# Patient Record
Sex: MALE | Race: WHITE | NOT HISPANIC OR LATINO | Employment: FULL TIME | URBAN - NONMETROPOLITAN AREA
[De-identification: names, ages, dates, MRNs, and addresses within clinical notes are randomized per-mention and may not be internally consistent; named-entity substitution may affect disease eponyms.]

---

## 2017-11-01 ENCOUNTER — TELEPHONE (OUTPATIENT)
Dept: INTERNAL MEDICINE | Age: 52
End: 2017-11-01

## 2017-11-01 DIAGNOSIS — K63.5 POLYP OF COLON, UNSPECIFIED PART OF COLON, UNSPECIFIED TYPE: Primary | ICD-10-CM

## 2017-11-10 ENCOUNTER — TELEPHONE (OUTPATIENT)
Dept: GASTROENTEROLOGY | Age: 52
End: 2017-11-10

## 2017-11-20 DIAGNOSIS — Z86.010 PERSONAL HISTORY OF COLONIC POLYPS: Primary | ICD-10-CM

## 2017-12-19 RX ORDER — SODIUM CHLORIDE 9 MG/ML
INJECTION, SOLUTION INTRAVENOUS CONTINUOUS
Status: CANCELLED | OUTPATIENT
Start: 2017-12-19

## 2017-12-19 RX ORDER — 0.9 % SODIUM CHLORIDE 0.9 %
2 VIAL (ML) INJECTION PRN
Status: CANCELLED | OUTPATIENT
Start: 2017-12-19

## 2017-12-19 RX ORDER — PEG-3350, SODIUM SULFATE, SODIUM CHLORIDE, POTASSIUM CHLORIDE, SODIUM ASCORBATE AND ASCORBIC ACID 7.5-2.691G
2000 KIT ORAL ONCE
Qty: 1 EACH | Refills: 0 | Status: SHIPPED | OUTPATIENT
Start: 2017-12-19 | End: 2017-12-22

## 2017-12-19 RX ORDER — 0.9 % SODIUM CHLORIDE 0.9 %
2 VIAL (ML) INJECTION EVERY 12 HOURS SCHEDULED
Status: CANCELLED | OUTPATIENT
Start: 2017-12-19

## 2017-12-22 ASSESSMENT — ACTIVITIES OF DAILY LIVING (ADL)
CHRONIC_PAIN_PRESENT: NO
ADL_SHORT_OF_BREATH: NO
HISTORY OF FALLING IN THE LAST YEAR (PRIOR TO ADMISSION): NO
ADL_BEFORE_ADMISSION: INDEPENDENT
SENSORY_SUPPORT_DEVICES: EYEGLASSES
NEEDS_ASSIST: NO
RECENT_DECLINE_ADL: NO
ADL_SCORE: 12

## 2017-12-22 ASSESSMENT — COGNITIVE AND FUNCTIONAL STATUS - GENERAL
ARE YOU DEAF OR DO YOU HAVE SERIOUS DIFFICULTY  HEARING: NO
ARE YOU BLIND OR DO YOU HAVE SERIOUS DIFFICULTY SEEING, EVEN WHEN WEARING GLASSES: NO

## 2017-12-27 ENCOUNTER — SURGERY (OUTPATIENT)
Age: 52
End: 2017-12-27

## 2017-12-27 ENCOUNTER — HOSPITAL ENCOUNTER (OUTPATIENT)
Age: 52
Discharge: HOME | End: 2017-12-27
Attending: INTERNAL MEDICINE | Admitting: INTERNAL MEDICINE

## 2017-12-27 VITALS
OXYGEN SATURATION: 94 % | HEART RATE: 68 BPM | BODY MASS INDEX: 26.82 KG/M2 | DIASTOLIC BLOOD PRESSURE: 71 MMHG | SYSTOLIC BLOOD PRESSURE: 121 MMHG | HEIGHT: 73 IN | WEIGHT: 202.38 LBS | TEMPERATURE: 97.6 F | RESPIRATION RATE: 16 BRPM

## 2017-12-27 DIAGNOSIS — Z86.010 PERSONAL HISTORY OF COLONIC POLYPS: ICD-10-CM

## 2017-12-27 PROCEDURE — 10004348 HB COUNTER-EVAL PRE-OP

## 2017-12-27 PROCEDURE — 10004651 HB RX, NO CHARGE ITEM: Performed by: INTERNAL MEDICINE

## 2017-12-27 PROCEDURE — 10002807 HB RX 258: Performed by: INTERNAL MEDICINE

## 2017-12-27 PROCEDURE — 10003428 HB COLONOSCOPY: Performed by: INTERNAL MEDICINE

## 2017-12-27 PROCEDURE — 88305 TISSUE EXAM BY PATHOLOGIST: CPT

## 2017-12-27 PROCEDURE — 10002800 HB RX 250 W HCPCS: Performed by: INTERNAL MEDICINE

## 2017-12-27 PROCEDURE — 10003056 HB DISPOSABLE INSTRUMENT/SUPPLY 1: Performed by: INTERNAL MEDICINE

## 2017-12-27 PROCEDURE — 45385 COLONOSCOPY W/LESION REMOVAL: CPT | Performed by: INTERNAL MEDICINE

## 2017-12-27 PROCEDURE — G0500 MOD SEDAT ENDO SERVICE >5YRS: HCPCS | Performed by: INTERNAL MEDICINE

## 2017-12-27 RX ORDER — 0.9 % SODIUM CHLORIDE 0.9 %
2 VIAL (ML) INJECTION PRN
Status: DISCONTINUED | OUTPATIENT
Start: 2017-12-27 | End: 2017-12-27 | Stop reason: HOSPADM

## 2017-12-27 RX ORDER — SODIUM CHLORIDE 9 MG/ML
INJECTION, SOLUTION INTRAVENOUS CONTINUOUS
Status: DISCONTINUED | OUTPATIENT
Start: 2017-12-27 | End: 2017-12-27 | Stop reason: HOSPADM

## 2017-12-27 RX ORDER — 0.9 % SODIUM CHLORIDE 0.9 %
2 VIAL (ML) INJECTION EVERY 12 HOURS SCHEDULED
Status: DISCONTINUED | OUTPATIENT
Start: 2017-12-27 | End: 2017-12-27 | Stop reason: HOSPADM

## 2017-12-27 RX ORDER — ONDANSETRON 2 MG/ML
INJECTION INTRAMUSCULAR; INTRAVENOUS PRN
Status: DISCONTINUED | OUTPATIENT
Start: 2017-12-27 | End: 2017-12-27 | Stop reason: HOSPADM

## 2017-12-27 RX ORDER — MIDAZOLAM HYDROCHLORIDE 1 MG/ML
INJECTION, SOLUTION INTRAMUSCULAR; INTRAVENOUS PRN
Status: DISCONTINUED | OUTPATIENT
Start: 2017-12-27 | End: 2017-12-27 | Stop reason: HOSPADM

## 2017-12-27 RX ADMIN — MIDAZOLAM HYDROCHLORIDE 1 MG: 1 INJECTION, SOLUTION INTRAMUSCULAR; INTRAVENOUS at 09:41

## 2017-12-27 RX ADMIN — MIDAZOLAM HYDROCHLORIDE 1 MG: 1 INJECTION, SOLUTION INTRAMUSCULAR; INTRAVENOUS at 09:33

## 2017-12-27 RX ADMIN — FENTANYL CITRATE 100 MCG: 50 INJECTION INTRAMUSCULAR; INTRAVENOUS at 09:29

## 2017-12-27 RX ADMIN — SODIUM CHLORIDE: 9 INJECTION, SOLUTION INTRAVENOUS at 09:15

## 2017-12-27 RX ADMIN — MIDAZOLAM HYDROCHLORIDE 1 MG: 1 INJECTION, SOLUTION INTRAMUSCULAR; INTRAVENOUS at 09:35

## 2017-12-27 RX ADMIN — ONDANSETRON 4 MG: 2 INJECTION INTRAMUSCULAR; INTRAVENOUS at 09:25

## 2017-12-27 RX ADMIN — MIDAZOLAM HYDROCHLORIDE 1 MG: 1 INJECTION, SOLUTION INTRAMUSCULAR; INTRAVENOUS at 09:31

## 2017-12-27 RX ADMIN — MIDAZOLAM HYDROCHLORIDE 2 MG: 1 INJECTION, SOLUTION INTRAMUSCULAR; INTRAVENOUS at 09:27

## 2017-12-27 RX ADMIN — SODIUM CHLORIDE 2 ML: 9 INJECTION INTRAMUSCULAR; INTRAVENOUS; SUBCUTANEOUS at 09:49

## 2017-12-27 RX ADMIN — FENTANYL CITRATE 25 MCG: 50 INJECTION INTRAMUSCULAR; INTRAVENOUS at 09:38

## 2017-12-27 ASSESSMENT — ACTIVITIES OF DAILY LIVING (ADL)
CHRONIC_PAIN_PRESENT: NO
ADL_BEFORE_ADMISSION: INDEPENDENT
HISTORY OF FALLING IN THE LAST YEAR (PRIOR TO ADMISSION): NO
NEEDS_ASSIST: NO
SENSORY_SUPPORT_DEVICES: EYEGLASSES
ADL_SCORE: 12
RECENT_DECLINE_ADL: NO
ADL_SHORT_OF_BREATH: NO

## 2017-12-27 ASSESSMENT — PAIN SCALES - GENERAL
PAIN_LEVEL_AT_REST: 0

## 2017-12-28 LAB — PATHOLOGIST NAME: NORMAL

## 2018-01-11 ENCOUNTER — TELEPHONE (OUTPATIENT)
Dept: GASTROENTEROLOGY | Age: 53
End: 2018-01-11

## 2018-08-28 ENCOUNTER — HOSPITAL ENCOUNTER (OUTPATIENT)
Dept: INFUSION THERAPY | Age: 53
Discharge: STILL ADMITTED OR ADMINISTRATIVE | End: 2018-08-28
Attending: INTERNAL MEDICINE

## 2018-08-28 VITALS
OXYGEN SATURATION: 96 % | WEIGHT: 218.48 LBS | HEART RATE: 85 BPM | BODY MASS INDEX: 28.82 KG/M2 | TEMPERATURE: 98.1 F | RESPIRATION RATE: 16 BRPM | SYSTOLIC BLOOD PRESSURE: 135 MMHG | DIASTOLIC BLOOD PRESSURE: 85 MMHG

## 2018-08-28 DIAGNOSIS — Z20.9 EXPOSURE TO BAT WITHOUT KNOWN BITE: ICD-10-CM

## 2018-08-28 DIAGNOSIS — Z20.9 EXPOSURE TO BAT WITHOUT KNOWN BITE: Primary | ICD-10-CM

## 2018-08-28 PROCEDURE — 90375 RABIES IG IM/SC: CPT | Performed by: INTERNAL MEDICINE

## 2018-08-28 PROCEDURE — 10002800 HB RX 250 W HCPCS: Performed by: INTERNAL MEDICINE

## 2018-08-28 PROCEDURE — 90472 IMMUNIZATION ADMIN EACH ADD: CPT | Performed by: INTERNAL MEDICINE

## 2018-08-28 PROCEDURE — 90675 RABIES VACCINE IM: CPT | Performed by: INTERNAL MEDICINE

## 2018-08-28 PROCEDURE — 90471 IMMUNIZATION ADMIN: CPT | Performed by: INTERNAL MEDICINE

## 2018-08-28 RX ADMIN — HUMAN RABIES VIRUS IMMUNE GLOBULIN 1950 UNITS: 150 INJECTION, SOLUTION INTRAMUSCULAR at 17:05

## 2018-08-28 RX ADMIN — RABIES VIRUS STRAIN PM-1503-3M ANTIGEN (PROPIOLACTONE INACTIVATED) AND WATER 2.5 UNITS: KIT at 17:15

## 2018-08-28 ASSESSMENT — PAIN SCALES - GENERAL: PAIN_LEVEL_AT_REST: 0

## 2018-08-31 ENCOUNTER — HOSPITAL ENCOUNTER (OUTPATIENT)
Dept: INFUSION THERAPY | Age: 53
Discharge: STILL ADMITTED OR ADMINISTRATIVE | End: 2018-08-31
Attending: INTERNAL MEDICINE

## 2018-08-31 VITALS
HEART RATE: 78 BPM | DIASTOLIC BLOOD PRESSURE: 82 MMHG | RESPIRATION RATE: 16 BRPM | SYSTOLIC BLOOD PRESSURE: 139 MMHG | TEMPERATURE: 98 F

## 2018-08-31 DIAGNOSIS — Z20.9 EXPOSURE TO BAT WITHOUT KNOWN BITE: ICD-10-CM

## 2018-08-31 PROCEDURE — 90471 IMMUNIZATION ADMIN: CPT | Performed by: INTERNAL MEDICINE

## 2018-08-31 PROCEDURE — 90675 RABIES VACCINE IM: CPT | Performed by: INTERNAL MEDICINE

## 2018-08-31 PROCEDURE — 10002800 HB RX 250 W HCPCS: Performed by: INTERNAL MEDICINE

## 2018-08-31 RX ADMIN — RABIES VIRUS STRAIN PM-1503-3M ANTIGEN (PROPIOLACTONE INACTIVATED) AND WATER 2.5 UNITS: KIT at 08:48

## 2018-09-04 ENCOUNTER — HOSPITAL ENCOUNTER (OUTPATIENT)
Dept: INFUSION THERAPY | Age: 53
Discharge: STILL ADMITTED OR ADMINISTRATIVE | End: 2018-09-04
Attending: INTERNAL MEDICINE

## 2018-09-04 VITALS
SYSTOLIC BLOOD PRESSURE: 135 MMHG | TEMPERATURE: 98.1 F | DIASTOLIC BLOOD PRESSURE: 85 MMHG | RESPIRATION RATE: 16 BRPM | HEART RATE: 73 BPM

## 2018-09-04 DIAGNOSIS — Z20.9 EXPOSURE TO BAT WITHOUT KNOWN BITE: ICD-10-CM

## 2018-09-04 PROCEDURE — 90471 IMMUNIZATION ADMIN: CPT

## 2018-09-04 PROCEDURE — 10002800 HB RX 250 W HCPCS

## 2018-09-04 PROCEDURE — 90675 RABIES VACCINE IM: CPT

## 2018-09-04 RX ADMIN — RABIES VIRUS STRAIN PM-1503-3M ANTIGEN (PROPIOLACTONE INACTIVATED) AND WATER 2.5 UNITS: KIT at 14:45

## 2018-09-11 ENCOUNTER — HOSPITAL ENCOUNTER (OUTPATIENT)
Dept: INFUSION THERAPY | Age: 53
Discharge: STILL ADMITTED OR ADMINISTRATIVE | End: 2018-09-11
Attending: INTERNAL MEDICINE

## 2018-09-11 VITALS
TEMPERATURE: 98 F | HEART RATE: 78 BPM | DIASTOLIC BLOOD PRESSURE: 76 MMHG | OXYGEN SATURATION: 96 % | SYSTOLIC BLOOD PRESSURE: 141 MMHG

## 2018-09-11 DIAGNOSIS — Z20.9 EXPOSURE TO BAT WITHOUT KNOWN BITE: ICD-10-CM

## 2018-09-11 PROCEDURE — 10002800 HB RX 250 W HCPCS: Performed by: INTERNAL MEDICINE

## 2018-09-11 PROCEDURE — 90675 RABIES VACCINE IM: CPT | Performed by: INTERNAL MEDICINE

## 2018-09-11 PROCEDURE — 90471 IMMUNIZATION ADMIN: CPT | Performed by: INTERNAL MEDICINE

## 2018-09-11 RX ADMIN — RABIES VIRUS STRAIN PM-1503-3M ANTIGEN (PROPIOLACTONE INACTIVATED) AND WATER 2.5 UNITS: KIT at 13:26

## 2018-12-26 ENCOUNTER — E-ADVICE (OUTPATIENT)
Dept: INTERNAL MEDICINE | Age: 53
End: 2018-12-26

## 2018-12-26 ENCOUNTER — OFFICE VISIT (OUTPATIENT)
Dept: DERMATOLOGY | Age: 53
End: 2018-12-26

## 2018-12-26 VITALS
BODY MASS INDEX: 30.05 KG/M2 | WEIGHT: 227.8 LBS | SYSTOLIC BLOOD PRESSURE: 136 MMHG | DIASTOLIC BLOOD PRESSURE: 84 MMHG | HEART RATE: 78 BPM

## 2018-12-26 DIAGNOSIS — B35.1 ONYCHOMYCOSIS: Primary | ICD-10-CM

## 2018-12-26 PROCEDURE — 99202 OFFICE O/P NEW SF 15 MIN: CPT | Performed by: DERMATOLOGY

## 2018-12-26 RX ORDER — TERBINAFINE HYDROCHLORIDE 250 MG/1
250 TABLET ORAL DAILY
Qty: 84 TABLET | Refills: 0 | Status: SHIPPED | OUTPATIENT
Start: 2018-12-26 | End: 2019-03-30

## 2018-12-28 ENCOUNTER — LAB SERVICES (OUTPATIENT)
Dept: LAB | Age: 53
End: 2018-12-28

## 2018-12-28 DIAGNOSIS — B35.1 ONYCHOMYCOSIS: ICD-10-CM

## 2018-12-28 LAB
ALBUMIN SERPL-MCNC: 4.3 G/DL (ref 3.6–5.1)
ALP SERPL-CCNC: 81 UNITS/L (ref 45–117)
ALT SERPL-CCNC: 49 UNITS/L
AST SERPL-CCNC: 20 UNITS/L
BILIRUB CONJ SERPL-MCNC: 0.1 MG/DL (ref 0–0.2)
BILIRUB SERPL-MCNC: 0.4 MG/DL (ref 0.2–1)
PROT SERPL-MCNC: 7.5 G/DL (ref 6.4–8.2)

## 2018-12-28 PROCEDURE — 80076 HEPATIC FUNCTION PANEL: CPT | Performed by: INTERNAL MEDICINE

## 2018-12-28 PROCEDURE — 36415 COLL VENOUS BLD VENIPUNCTURE: CPT | Performed by: INTERNAL MEDICINE

## 2019-04-08 ENCOUNTER — E-ADVICE (OUTPATIENT)
Dept: DERMATOLOGY | Age: 54
End: 2019-04-08

## 2019-12-17 ENCOUNTER — TELEPHONE (OUTPATIENT)
Dept: SCHEDULING | Age: 54
End: 2019-12-17

## 2019-12-17 ENCOUNTER — CLERICAL ORDERS (OUTPATIENT)
Dept: SCHEDULING | Age: 54
End: 2019-12-17

## 2019-12-17 DIAGNOSIS — Z13.9 VISIT FOR SCREENING: Primary | ICD-10-CM

## 2019-12-23 ENCOUNTER — HOSPITAL ENCOUNTER (OUTPATIENT)
Dept: CT IMAGING | Age: 54
Discharge: HOME | End: 2019-12-23
Attending: INTERNAL MEDICINE

## 2019-12-23 DIAGNOSIS — Z13.9 VISIT FOR SCREENING: ICD-10-CM

## 2019-12-23 PROCEDURE — 75571 CT HRT W/O DYE W/CA TEST: CPT

## 2019-12-24 ENCOUNTER — TELEPHONE (OUTPATIENT)
Dept: INTERNAL MEDICINE | Age: 54
End: 2019-12-24

## 2019-12-26 ENCOUNTER — TELEPHONE (OUTPATIENT)
Dept: CARDIOLOGY | Age: 54
End: 2019-12-26

## 2020-06-10 ENCOUNTER — LAB SERVICES (OUTPATIENT)
Dept: LAB | Age: 55
End: 2020-06-10
Attending: INTERNAL MEDICINE

## 2020-06-10 PROCEDURE — 86769 SARS-COV-2 COVID-19 ANTIBODY: CPT | Performed by: INTERNAL MEDICINE

## 2020-06-10 PROCEDURE — 36415 COLL VENOUS BLD VENIPUNCTURE: CPT | Performed by: INTERNAL MEDICINE

## 2020-06-11 LAB
SARS-COV-2 IGG SERPL QL IA: NEGATIVE
SARS-COV-2 N IGG SERPL QL IA: 0.04

## 2020-12-23 ENCOUNTER — E-ADVICE (OUTPATIENT)
Dept: INTERNAL MEDICINE | Age: 55
End: 2020-12-23

## 2020-12-27 ENCOUNTER — IMMUNIZATION (OUTPATIENT)
Dept: LAB | Age: 55
End: 2020-12-27

## 2020-12-27 DIAGNOSIS — Z23 NEED FOR VACCINATION: Primary | ICD-10-CM

## 2020-12-27 PROCEDURE — 91300 COVID 19 PFIZER-BIONTECH: CPT

## 2020-12-27 PROCEDURE — 0001A COVID 19 PFIZER-BIONTECH: CPT

## 2021-01-01 ENCOUNTER — EXTERNAL RECORD (OUTPATIENT)
Dept: OTHER | Age: 56
End: 2021-01-01

## 2021-01-05 ENCOUNTER — APPOINTMENT (OUTPATIENT)
Dept: INTERNAL MEDICINE | Age: 56
End: 2021-01-05

## 2021-01-12 PROBLEM — K63.5 COLON POLYPS: Status: ACTIVE | Noted: 2021-01-12

## 2021-01-13 ENCOUNTER — OFFICE VISIT (OUTPATIENT)
Dept: INTERNAL MEDICINE | Age: 56
End: 2021-01-13

## 2021-01-13 VITALS
SYSTOLIC BLOOD PRESSURE: 126 MMHG | HEIGHT: 73 IN | BODY MASS INDEX: 25.68 KG/M2 | WEIGHT: 193.78 LBS | RESPIRATION RATE: 15 BRPM | HEART RATE: 78 BPM | DIASTOLIC BLOOD PRESSURE: 86 MMHG

## 2021-01-13 DIAGNOSIS — Z11.59 NEED FOR HEPATITIS C SCREENING TEST: ICD-10-CM

## 2021-01-13 DIAGNOSIS — K63.5 POLYP OF COLON, UNSPECIFIED PART OF COLON, UNSPECIFIED TYPE: ICD-10-CM

## 2021-01-13 DIAGNOSIS — Z00.00 ENCOUNTER FOR PREVENTIVE HEALTH EXAMINATION: Primary | ICD-10-CM

## 2021-01-13 PROBLEM — Z20.9 EXPOSURE TO BAT WITHOUT KNOWN BITE: Status: RESOLVED | Noted: 2018-08-28 | Resolved: 2021-01-13

## 2021-01-13 PROCEDURE — 99396 PREV VISIT EST AGE 40-64: CPT | Performed by: INTERNAL MEDICINE

## 2021-01-13 RX ORDER — EPINEPHRINE 0.3 MG/.3ML
0.3 INJECTION SUBCUTANEOUS
Qty: 2 EACH | Refills: 1 | Status: SHIPPED | OUTPATIENT
Start: 2021-01-13

## 2021-01-13 RX ORDER — MULTIVITAMIN,THER AND MINERALS
TABLET ORAL
COMMUNITY

## 2021-01-13 SDOH — HEALTH STABILITY: MENTAL HEALTH: HOW MANY STANDARD DRINKS CONTAINING ALCOHOL DO YOU HAVE ON A TYPICAL DAY?: 1 OR 2

## 2021-01-13 SDOH — HEALTH STABILITY: MENTAL HEALTH: HOW OFTEN DO YOU HAVE A DRINK CONTAINING ALCOHOL?: MONTHLY OR LESS

## 2021-01-13 SDOH — HEALTH STABILITY: MENTAL HEALTH: HOW OFTEN DO YOU HAVE 6 OR MORE DRINKS ON ONE OCCASION?: NEVER

## 2021-01-13 ASSESSMENT — PATIENT HEALTH QUESTIONNAIRE - PHQ9
2. FEELING DOWN, DEPRESSED OR HOPELESS: NOT AT ALL
6. FEELING BAD ABOUT YOURSELF - OR THAT YOU ARE A FAILURE OR HAVE LET YOURSELF OR YOUR FAMILY DOWN: NOT AT ALL
7. TROUBLE CONCENTRATING ON THINGS, SUCH AS READING THE NEWSPAPER OR WATCHING TELEVISION: NOT AT ALL
CLINICAL INTERPRETATION OF PHQ2 SCORE: MINIMAL DEPRESSION
4. FEELING TIRED OR HAVING LITTLE ENERGY: NOT AT ALL
1. LITTLE INTEREST OR PLEASURE IN DOING THINGS: NOT AT ALL
CLINICAL INTERPRETATION OF PHQ9 SCORE: NO FURTHER SCREENING NEEDED
SUM OF ALL RESPONSES TO PHQ9 QUESTIONS 1 AND 2: 0
SUM OF ALL RESPONSES TO PHQ9 QUESTIONS 1 AND 2: 0
5. POOR APPETITE, WEIGHT LOSS, OR OVEREATING: NOT AT ALL
SUM OF ALL RESPONSES TO PHQ QUESTIONS 1-9: 2
10. IF YOU CHECKED OFF ANY PROBLEMS, HOW DIFFICULT HAVE THESE PROBLEMS MADE IT FOR YOU TO DO YOUR WORK, TAKE CARE OF THINGS AT HOME, OR GET ALONG WITH OTHER PEOPLE: NOT DIFFICULT AT ALL
SUM OF ALL RESPONSES TO PHQ9 QUESTIONS 1 TO 9: 2
9. THOUGHTS THAT YOU WOULD BE BETTER OFF DEAD, OR OF HURTING YOURSELF: NOT AT ALL
CLINICAL INTERPRETATION OF PHQ9 SCORE: MINIMAL DEPRESSION
8. MOVING OR SPEAKING SO SLOWLY THAT OTHER PEOPLE COULD HAVE NOTICED. OR THE OPPOSITE, BEING SO FIGETY OR RESTLESS THAT YOU HAVE BEEN MOVING AROUND A LOT MORE THAN USUAL: NOT AT ALL
3. TROUBLE FALLING OR STAYING ASLEEP OR SLEEPING TOO MUCH: MORE THAN HALF THE DAYS
CLINICAL INTERPRETATION OF PHQ2 SCORE: NO FURTHER SCREENING NEEDED

## 2021-01-15 ENCOUNTER — IMMUNIZATION (OUTPATIENT)
Dept: LAB | Age: 56
End: 2021-01-15

## 2021-01-15 DIAGNOSIS — Z23 NEED FOR VACCINATION: Primary | ICD-10-CM

## 2021-01-15 PROCEDURE — 0002A COVID 19 PFIZER-BIONTECH: CPT

## 2021-01-15 PROCEDURE — 91300 COVID 19 PFIZER-BIONTECH: CPT

## 2021-02-19 ENCOUNTER — TELEPHONE (OUTPATIENT)
Dept: FAMILY MEDICINE | Age: 56
End: 2021-02-19

## 2021-02-19 ENCOUNTER — LAB SERVICES (OUTPATIENT)
Dept: LAB | Age: 56
End: 2021-02-19

## 2021-02-19 DIAGNOSIS — Z00.00 ENCOUNTER FOR PREVENTIVE HEALTH EXAMINATION: ICD-10-CM

## 2021-02-19 DIAGNOSIS — Z11.59 NEED FOR HEPATITIS C SCREENING TEST: ICD-10-CM

## 2021-02-19 LAB
CHOLEST SERPL-MCNC: 203 MG/DL
CHOLEST/HDLC SERPL: 4.2 {RATIO}
FASTING DURATION TIME PATIENT: 12 HOURS
FASTING DURATION TIME PATIENT: 12 HOURS
GLUCOSE SERPL-MCNC: 88 MG/DL (ref 65–99)
HDLC SERPL-MCNC: 48 MG/DL
LDLC SERPL CALC-MCNC: 139 MG/DL
NONHDLC SERPL-MCNC: 155 MG/DL
TRIGL SERPL-MCNC: 82 MG/DL

## 2021-02-19 PROCEDURE — 82947 ASSAY GLUCOSE BLOOD QUANT: CPT | Performed by: INTERNAL MEDICINE

## 2021-02-19 PROCEDURE — 86803 HEPATITIS C AB TEST: CPT | Performed by: INTERNAL MEDICINE

## 2021-02-19 PROCEDURE — 36415 COLL VENOUS BLD VENIPUNCTURE: CPT | Performed by: INTERNAL MEDICINE

## 2021-02-19 PROCEDURE — 84153 ASSAY OF PSA TOTAL: CPT | Performed by: INTERNAL MEDICINE

## 2021-02-19 PROCEDURE — 80061 LIPID PANEL: CPT | Performed by: INTERNAL MEDICINE

## 2021-02-20 LAB
HCV AB SER QL: NEGATIVE
PSA SERPL-MCNC: 0.73 NG/ML

## 2021-02-22 ENCOUNTER — TELEPHONE (OUTPATIENT)
Dept: INTERNAL MEDICINE | Age: 56
End: 2021-02-22

## 2021-03-23 ENCOUNTER — LAB SERVICES (OUTPATIENT)
Dept: URGENT CARE | Age: 56
End: 2021-03-23

## 2021-03-23 DIAGNOSIS — Z01.812 ENCOUNTER FOR SCREENING LABORATORY TESTING FOR COVID-19 VIRUS IN ASYMPTOMATIC PATIENT: ICD-10-CM

## 2021-03-23 DIAGNOSIS — Z11.52 ENCOUNTER FOR SCREENING LABORATORY TESTING FOR COVID-19 VIRUS IN ASYMPTOMATIC PATIENT: ICD-10-CM

## 2021-03-23 PROCEDURE — U0005 INFEC AGEN DETEC AMPLI PROBE: HCPCS | Performed by: INTERNAL MEDICINE

## 2021-03-23 PROCEDURE — U0003 INFECTIOUS AGENT DETECTION BY NUCLEIC ACID (DNA OR RNA); SEVERE ACUTE RESPIRATORY SYNDROME CORONAVIRUS 2 (SARS-COV-2) (CORONAVIRUS DISEASE [COVID-19]), AMPLIFIED PROBE TECHNIQUE, MAKING USE OF HIGH THROUGHPUT TECHNOLOGIES AS DESCRIBED BY CMS-2020-01-R: HCPCS | Performed by: INTERNAL MEDICINE

## 2021-03-24 LAB
SARS-COV-2 RNA RESP QL NAA+PROBE: NOT DETECTED
SERVICE CMNT-IMP: NORMAL
SERVICE CMNT-IMP: NORMAL

## 2021-04-02 ENCOUNTER — TELEPHONE (OUTPATIENT)
Dept: GASTROENTEROLOGY | Age: 56
End: 2021-04-02

## 2021-06-02 ENCOUNTER — E-ADVICE (OUTPATIENT)
Dept: INTERNAL MEDICINE | Age: 56
End: 2021-06-02

## 2021-09-02 ENCOUNTER — LAB SERVICES (OUTPATIENT)
Dept: URGENT CARE | Age: 56
End: 2021-09-02

## 2021-09-02 DIAGNOSIS — Z01.812 ENCOUNTER FOR SCREENING LABORATORY TESTING FOR COVID-19 VIRUS IN ASYMPTOMATIC PATIENT: ICD-10-CM

## 2021-09-02 DIAGNOSIS — Z11.52 ENCOUNTER FOR SCREENING LABORATORY TESTING FOR COVID-19 VIRUS IN ASYMPTOMATIC PATIENT: ICD-10-CM

## 2021-09-02 PROCEDURE — U0005 INFEC AGEN DETEC AMPLI PROBE: HCPCS | Performed by: INTERNAL MEDICINE

## 2021-09-02 PROCEDURE — U0003 INFECTIOUS AGENT DETECTION BY NUCLEIC ACID (DNA OR RNA); SEVERE ACUTE RESPIRATORY SYNDROME CORONAVIRUS 2 (SARS-COV-2) (CORONAVIRUS DISEASE [COVID-19]), AMPLIFIED PROBE TECHNIQUE, MAKING USE OF HIGH THROUGHPUT TECHNOLOGIES AS DESCRIBED BY CMS-2020-01-R: HCPCS | Performed by: INTERNAL MEDICINE

## 2021-09-03 ENCOUNTER — E-ADVICE (OUTPATIENT)
Dept: INTERNAL MEDICINE | Age: 56
End: 2021-09-03

## 2021-09-03 LAB
SARS-COV-2 RNA RESP QL NAA+PROBE: NOT DETECTED
SERVICE CMNT-IMP: NORMAL
SERVICE CMNT-IMP: NORMAL

## 2021-10-21 ENCOUNTER — E-ADVICE (OUTPATIENT)
Dept: INTERNAL MEDICINE | Age: 56
End: 2021-10-21

## 2021-11-10 ENCOUNTER — E-ADVICE (OUTPATIENT)
Dept: INTERNAL MEDICINE | Age: 56
End: 2021-11-10

## 2021-11-10 LAB — SARS-COV-2 RNA SPEC QL NAA+PROBE: NEGATIVE

## 2022-04-05 ENCOUNTER — E-ADVICE (OUTPATIENT)
Dept: INTERNAL MEDICINE | Age: 57
End: 2022-04-05

## 2022-04-05 RX ORDER — PREDNISONE 20 MG/1
40 TABLET ORAL DAILY
Qty: 10 TABLET | Refills: 0 | Status: SHIPPED | OUTPATIENT
Start: 2022-04-05

## 2022-05-05 ENCOUNTER — E-ADVICE (OUTPATIENT)
Dept: INTERNAL MEDICINE | Age: 57
End: 2022-05-05

## 2022-11-08 ENCOUNTER — E-ADVICE (OUTPATIENT)
Dept: INTERNAL MEDICINE | Age: 57
End: 2022-11-08

## 2022-11-09 ENCOUNTER — TELEPHONE (OUTPATIENT)
Dept: GASTROENTEROLOGY | Age: 57
End: 2022-11-09

## 2022-11-15 DIAGNOSIS — Z12.11 SCREENING FOR COLON CANCER: Primary | ICD-10-CM

## 2022-11-15 DIAGNOSIS — Z86.010 HISTORY OF COLON POLYPS: ICD-10-CM

## 2022-11-15 RX ORDER — PEG-3350, SODIUM SULFATE, SODIUM CHLORIDE, POTASSIUM CHLORIDE, SODIUM ASCORBATE AND ASCORBIC ACID 7.5-2.691G
KIT ORAL
Qty: 2000 ML | Refills: 0 | Status: SHIPPED | OUTPATIENT
Start: 2022-11-15 | End: 2023-08-14 | Stop reason: SDUPTHER

## 2022-11-20 RX ORDER — SODIUM CHLORIDE 9 MG/ML
INJECTION, SOLUTION INTRAVENOUS CONTINUOUS
Status: CANCELLED | OUTPATIENT
Start: 2022-11-20

## 2022-11-20 RX ORDER — 0.9 % SODIUM CHLORIDE 0.9 %
2 VIAL (ML) INJECTION EVERY 12 HOURS SCHEDULED
Status: CANCELLED | OUTPATIENT
Start: 2022-11-20

## 2022-11-21 ENCOUNTER — E-ADVICE (OUTPATIENT)
Dept: INTERNAL MEDICINE | Age: 57
End: 2022-11-21

## 2022-11-30 ENCOUNTER — TELEPHONE (OUTPATIENT)
Dept: GASTROENTEROLOGY | Age: 57
End: 2022-11-30

## 2022-12-06 ENCOUNTER — E-ADVICE (OUTPATIENT)
Dept: GASTROENTEROLOGY | Age: 57
End: 2022-12-06

## 2023-01-20 RX ORDER — EPINEPHRINE 0.3 MG/.3ML
0.3 INJECTION SUBCUTANEOUS
Qty: 2 EACH | Refills: 1 | Status: CANCELLED | OUTPATIENT
Start: 2023-01-20

## 2023-01-20 RX ORDER — ALBUTEROL SULFATE 90 UG/1
2 AEROSOL, METERED RESPIRATORY (INHALATION) EVERY 4 HOURS PRN
Qty: 1 EACH | Refills: 4 | Status: CANCELLED | OUTPATIENT
Start: 2023-01-20

## 2023-01-31 ENCOUNTER — APPOINTMENT (OUTPATIENT)
Dept: INTERNAL MEDICINE | Age: 58
End: 2023-01-31

## 2023-02-02 ENCOUNTER — TELEPHONE (OUTPATIENT)
Dept: GASTROENTEROLOGY | Age: 58
End: 2023-02-02

## 2023-02-14 RX ORDER — SODIUM CHLORIDE 9 MG/ML
INJECTION, SOLUTION INTRAVENOUS CONTINUOUS
Status: CANCELLED | OUTPATIENT
Start: 2023-02-14

## 2023-02-14 RX ORDER — 0.9 % SODIUM CHLORIDE 0.9 %
2 VIAL (ML) INJECTION EVERY 12 HOURS SCHEDULED
Status: CANCELLED | OUTPATIENT
Start: 2023-02-14

## 2023-02-20 ENCOUNTER — TELEPHONE (OUTPATIENT)
Dept: GASTROENTEROLOGY | Age: 58
End: 2023-02-20

## 2023-06-14 ENCOUNTER — APPOINTMENT (OUTPATIENT)
Dept: URGENT CARE | Age: 58
End: 2023-06-14

## 2023-08-14 ENCOUNTER — TELEPHONE (OUTPATIENT)
Dept: GASTROENTEROLOGY | Age: 58
End: 2023-08-14

## 2023-08-14 DIAGNOSIS — Z12.11 SCREENING FOR COLON CANCER: Primary | ICD-10-CM

## 2023-08-14 DIAGNOSIS — Z86.010 HISTORY OF COLON POLYPS: ICD-10-CM

## 2023-08-14 RX ORDER — PEG-3350, SODIUM SULFATE, SODIUM CHLORIDE, POTASSIUM CHLORIDE, SODIUM ASCORBATE AND ASCORBIC ACID 7.5-2.691G
KIT ORAL
Qty: 2000 ML | Refills: 0 | Status: SHIPPED | OUTPATIENT
Start: 2023-08-14

## 2023-08-17 ENCOUNTER — TELEPHONE (OUTPATIENT)
Dept: GASTROENTEROLOGY | Age: 58
End: 2023-08-17

## 2023-08-17 ASSESSMENT — ACTIVITIES OF DAILY LIVING (ADL)
ADL_SCORE: 12
NEEDS_ASSIST: NO
HISTORY OF FALLING IN THE LAST YEAR (PRIOR TO ADMISSION): NO
ADL_BEFORE_ADMISSION: INDEPENDENT
RECENT_DECLINE_ADL: NO
CHRONIC_PAIN_PRESENT: NO
SENSORY_SUPPORT_DEVICES: EYEGLASSES
ADL_SHORT_OF_BREATH: NO

## 2023-08-17 ASSESSMENT — COGNITIVE AND FUNCTIONAL STATUS - GENERAL
ARE YOU BLIND OR DO YOU HAVE SERIOUS DIFFICULTY SEEING, EVEN WHEN WEARING GLASSES: NO
ARE YOU DEAF OR DO YOU HAVE SERIOUS DIFFICULTY  HEARING: NO

## 2023-08-22 ENCOUNTER — HOSPITAL ENCOUNTER (OUTPATIENT)
Age: 58
Discharge: HOME | End: 2023-08-22
Attending: INTERNAL MEDICINE | Admitting: INTERNAL MEDICINE

## 2023-08-22 ENCOUNTER — TELEPHONE (OUTPATIENT)
Dept: GASTROENTEROLOGY | Age: 58
End: 2023-08-22

## 2023-08-22 DIAGNOSIS — Z12.11 SCREENING FOR COLON CANCER: ICD-10-CM

## 2023-08-22 DIAGNOSIS — Z86.010 HISTORY OF COLON POLYPS: ICD-10-CM

## 2023-08-22 DIAGNOSIS — D12.5 ADENOMATOUS POLYP OF SIGMOID COLON: ICD-10-CM

## 2023-08-22 PROCEDURE — 88305 TISSUE EXAM BY PATHOLOGIST: CPT | Performed by: INTERNAL MEDICINE

## 2023-08-22 PROCEDURE — 10003428 HB COLONOSCOPY: Performed by: INTERNAL MEDICINE

## 2023-08-22 PROCEDURE — 10004348 HB COUNTER-EVAL PRE-OP

## 2023-08-22 PROCEDURE — 10002801 HB RX 250 W/O HCPCS: Performed by: INTERNAL MEDICINE

## 2023-08-22 PROCEDURE — 10002807 HB RX 258: Performed by: INTERNAL MEDICINE

## 2023-08-22 PROCEDURE — G0500 MOD SEDAT ENDO SERVICE >5YRS: HCPCS | Performed by: INTERNAL MEDICINE

## 2023-08-22 PROCEDURE — 10002800 HB RX 250 W HCPCS: Performed by: INTERNAL MEDICINE

## 2023-08-22 PROCEDURE — 45385 COLONOSCOPY W/LESION REMOVAL: CPT | Performed by: INTERNAL MEDICINE

## 2023-08-22 RX ORDER — SODIUM CHLORIDE 9 MG/ML
INJECTION, SOLUTION INTRAVENOUS CONTINUOUS
Status: CANCELLED | OUTPATIENT
Start: 2023-08-22

## 2023-08-22 RX ORDER — 0.9 % SODIUM CHLORIDE 0.9 %
2 VIAL (ML) INJECTION EVERY 12 HOURS SCHEDULED
Status: CANCELLED | OUTPATIENT
Start: 2023-08-22

## 2023-08-22 RX ORDER — SODIUM CHLORIDE 9 MG/ML
INJECTION, SOLUTION INTRAVENOUS CONTINUOUS
Status: DISCONTINUED | OUTPATIENT
Start: 2023-08-22 | End: 2023-08-22 | Stop reason: HOSPADM

## 2023-08-22 RX ORDER — MIDAZOLAM HYDROCHLORIDE 1 MG/ML
INJECTION, SOLUTION INTRAMUSCULAR; INTRAVENOUS
Status: DISCONTINUED
Start: 2023-08-22 | End: 2023-08-22 | Stop reason: HOSPADM

## 2023-08-22 RX ORDER — MIDAZOLAM HYDROCHLORIDE 1 MG/ML
INJECTION, SOLUTION INTRAMUSCULAR; INTRAVENOUS PRN
Status: DISCONTINUED | OUTPATIENT
Start: 2023-08-22 | End: 2023-08-22 | Stop reason: HOSPADM

## 2023-08-22 RX ORDER — 0.9 % SODIUM CHLORIDE 0.9 %
2 VIAL (ML) INJECTION EVERY 12 HOURS SCHEDULED
Status: DISCONTINUED | OUTPATIENT
Start: 2023-08-22 | End: 2023-08-22 | Stop reason: HOSPADM

## 2023-08-22 RX ADMIN — SODIUM CHLORIDE: 9 INJECTION, SOLUTION INTRAVENOUS at 11:45

## 2023-08-22 ASSESSMENT — PAIN SCALES - GENERAL
PAINLEVEL_OUTOF10: 0

## 2023-08-23 VITALS
HEIGHT: 74 IN | RESPIRATION RATE: 16 BRPM | OXYGEN SATURATION: 97 % | SYSTOLIC BLOOD PRESSURE: 119 MMHG | BODY MASS INDEX: 27.95 KG/M2 | DIASTOLIC BLOOD PRESSURE: 92 MMHG | HEART RATE: 73 BPM | TEMPERATURE: 97.8 F | WEIGHT: 217.81 LBS

## 2023-08-23 LAB
ASR DISCLAIMER: NORMAL
CASE RPRT: NORMAL
CLINICAL INFO: NORMAL
PATH REPORT.FINAL DX SPEC: NORMAL
PATH REPORT.GROSS SPEC: NORMAL
PATH REPORT.MICROSCOPIC SPEC OTHER STN: NORMAL

## 2023-08-30 ENCOUNTER — TELEPHONE (OUTPATIENT)
Dept: GASTROENTEROLOGY | Age: 58
End: 2023-08-30

## 2023-11-10 ENCOUNTER — TRANSFERRED RECORDS (OUTPATIENT)
Dept: MULTI SPECIALTY CLINIC | Facility: CLINIC | Age: 58
End: 2023-11-10

## 2023-11-20 ENCOUNTER — OFFICE VISIT (OUTPATIENT)
Dept: FAMILY MEDICINE | Facility: CLINIC | Age: 58
End: 2023-11-20
Payer: COMMERCIAL

## 2023-11-20 VITALS
SYSTOLIC BLOOD PRESSURE: 166 MMHG | TEMPERATURE: 97.8 F | HEIGHT: 73 IN | OXYGEN SATURATION: 94 % | WEIGHT: 222 LBS | RESPIRATION RATE: 17 BRPM | DIASTOLIC BLOOD PRESSURE: 92 MMHG | HEART RATE: 74 BPM | BODY MASS INDEX: 29.42 KG/M2

## 2023-11-20 DIAGNOSIS — Z13.1 SCREENING FOR DIABETES MELLITUS: ICD-10-CM

## 2023-11-20 DIAGNOSIS — Z13.220 SCREENING FOR LIPID DISORDERS: ICD-10-CM

## 2023-11-20 DIAGNOSIS — Z23 NEED FOR DIPHTHERIA-TETANUS-PERTUSSIS (TDAP) VACCINE: ICD-10-CM

## 2023-11-20 DIAGNOSIS — Z12.5 SCREENING FOR PROSTATE CANCER: ICD-10-CM

## 2023-11-20 DIAGNOSIS — Z00.00 ROUTINE GENERAL MEDICAL EXAMINATION AT A HEALTH CARE FACILITY: Primary | ICD-10-CM

## 2023-11-20 ASSESSMENT — ENCOUNTER SYMPTOMS
SHORTNESS OF BREATH: 0
NERVOUS/ANXIOUS: 0
ARTHRALGIAS: 0
DIZZINESS: 0
HEMATURIA: 0
NAUSEA: 0
DIARRHEA: 0
ABDOMINAL PAIN: 0
FREQUENCY: 0
FEVER: 0
HEARTBURN: 0
DYSURIA: 0
EYE PAIN: 0
PALPITATIONS: 0
PARESTHESIAS: 0
COUGH: 0
MYALGIAS: 0
HEADACHES: 0
CHILLS: 0
CONSTIPATION: 0
WEAKNESS: 0
JOINT SWELLING: 0
SORE THROAT: 0
HEMATOCHEZIA: 0

## 2023-11-20 NOTE — PATIENT INSTRUCTIONS
IMPRESSION  Dennis is a 57 yo making his first visit to our clinic.     ASSESSMENT/PLAN:    Annual Exam/Preventive Issues   -Will check Lipids, PSA, BMP and A1C. [place] our clinic so will schedule for a fasting lab only visit  - Up to date on Covid and Flu  -Give Tdap today  -Repeat Colonoscopy in 2028.   - Please bring us your Advance Directives    -Specific concerns:     Elevated blood pressure     -Home readings are lower than in office, but still on the high side at around 130-140/80-90. Dennis really wants to avoid medication.   - Discussed that even small amounts of alcohol seem to cause an increase in alcohol.   - Also, will try to continue to lose weight through diet and exercise.   - Try to follow the DASH diet.   - Record home BPs on cell phone summer  - Return to clinic in about 2-3 months to go over numbers and discuss other options.     2. CV Risk:   Had CAC about 5 years ago with No Calcium in Coronary arteries      -Follow up: This Friday for Labs and Maybe Shingles.   2-3 months for BP check    Victor M Webb MD  Family Medicine and Sports Medicine    Preventive Health Recommendations  Male Ages 50 - 64    Yearly exam:             See your health care provider every year in order to  o   Review health changes.   o   Discuss preventive care.    o   Review your medicines if your doctor has prescribed any.   Have a cholesterol test every 5 years, or more frequently if you are at risk for high cholesterol/heart disease.   Have a diabetes test (fasting glucose) every three years. If you are at risk for diabetes, you should have this test more often.   Have a colonoscopy at age 45, or have a yearly FIT test (stool test). These exams will check for colon cancer.    Talk with your health care provider about whether or not a prostate cancer screening test (PSA) is right for you.  You should be tested each year for STDs (sexually transmitted diseases), if you re at risk.     Shots: Get a flu shot each year.  Get a tetanus shot every 10 years.     Nutrition:  Eat at least 5 servings of fruits and vegetables daily.   Eat whole-grain bread, whole-wheat pasta and brown rice instead of white grains and rice.   Get adequate Calcium and Vitamin D.     Lifestyle  Exercise for at least 150 minutes a week (30 minutes a day, 5 days a week). This will help you control your weight and prevent disease.   Limit alcohol to one drink per day.   No smoking.   Wear sunscreen to prevent skin cancer.   See your dentist every six months for an exam and cleaning.   See your eye doctor every 1 to 2 years.

## 2023-11-20 NOTE — NURSING NOTE
"58 year old  Chief Complaint   Patient presents with    Physical     No concerns       Blood pressure (!) 150/85, pulse 74, temperature 97.8  F (36.6  C), temperature source Temporal, resp. rate 17, height 1.861 m (6' 1.25\"), weight 100.7 kg (222 lb), SpO2 94%. Body mass index is 29.09 kg/m .  There is no problem list on file for this patient.      Wt Readings from Last 2 Encounters:   11/20/23 100.7 kg (222 lb)     BP Readings from Last 3 Encounters:   11/20/23 (!) 150/85         No current outpatient medications on file.     No current facility-administered medications for this visit.       Social History     Tobacco Use    Smoking status: Never    Smokeless tobacco: Never   Vaping Use    Vaping Use: Never used   Substance Use Topics    Alcohol use: Yes     Alcohol/week: 9.0 standard drinks of alcohol     Types: 9 Standard drinks or equivalent per week    Drug use: Never       Health Maintenance Due   Topic Date Due    ADVANCE CARE PLANNING  Never done    COLORECTAL CANCER SCREENING  Never done    HIV SCREENING  Never done    HEPATITIS C SCREENING  Never done    IPV IMMUNIZATION (2 of 3 - Adult catch-up series) 07/24/1998    LIPID  Never done    ZOSTER IMMUNIZATION (1 of 2) Never done    DTAP/TDAP/TD IMMUNIZATION (2 - Td or Tdap) 06/01/2020    PHQ-2 (once per calendar year)  Never done       No results found for: \"PAP\"      November 20, 2023 2:19 PM      Prior to immunization administration, verified patients identity using patient s name and date of birth. Please see Immunization Activity for additional information.     Screening Questionnaire for Adult Immunization    Are you sick today?   No   Do you have allergies to medications, food, a vaccine component or latex?   No   Have you ever had a serious reaction after receiving a vaccination?   No   Do you have a long-term health problem with heart, lung, kidney, or metabolic disease (e.g., diabetes), asthma, a blood disorder, no spleen, complement component " deficiency, a cochlear implant, or a spinal fluid leak?  Are you on long-term aspirin therapy?   No   Do you have cancer, leukemia, HIV/AIDS, or any other immune system problem?   No   Do you have a parent, brother, or sister with an immune system problem?   No   In the past 3 months, have you taken medications that affect  your immune system, such as prednisone, other steroids, or anticancer drugs; drugs for the treatment of rheumatoid arthritis, Crohn s disease, or psoriasis; or have you had radiation treatments?   No   Have you had a seizure, or a brain or other nervous system problem?   No   During the past year, have you received a transfusion of blood or blood    products, or been given immune (gamma) globulin or antiviral drug?   No   For women: Are you pregnant or is there a chance you could become       pregnant during the next month?   No   Have you received any vaccinations in the past 4 weeks?   No     Immunization questionnaire answers were all negative.      Patient instructed to remain in clinic for 15 minutes afterwards, and to report any adverse reactions.     Screening performed by Marti Nathan LPN on 11/20/2023 at 3:05 PM.

## 2023-11-20 NOTE — PROGRESS NOTES
Nick Erwin presents to [facility] today to have an annual exam.     IMPRESSION  Dennis is a 57 yo making his first visit to our clinic.     ASSESSMENT/PLAN:    Annual Exam/Preventive Issues   -Will check Lipids, PSA, BMP and A1C. He lives above our clinic so will schedule for a fasting lab only visit  - Up to date on Covid and Flu  -Give Tdap today  -Repeat Colonoscopy in 2028.   - Please bring us your Advance Directives    -Specific concerns:     Elevated blood pressure     -Home readings are lower than in office, but still on the high side at around 130-140/80-90. Dennis really wants to avoid medication.   - Discussed that even small amounts of alcohol seem to cause an increase in blood pressurs.   - Also, will try to continue to lose weight through diet and exercise.   - Try to follow the DASH diet.   - Record home BPs on cell phone summer  - Return to clinic in about 2-3 months to go over numbers and discuss other options.     2. CV Risk:   Had CAC about 4 years ago with No Calcium in Coronary arteries.        -Follow up: This Friday for Labs and Maybe Shingles.   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  He presents to [facility] today for his first visit.  He is here to establish care and have an annual exam.      He has no current concerns.  States that he is generally healthy and he has been very fortunate with his ancestors as both his parents are still alive and well.  He refers to his father having not the healthiest lifestyle but having no calcium in his coronary arteries.  In fact, Dennis has also had a coronary artery calcium score about 4 years ago and he had no calcium in his coronary arteries.      Dennis refers to having had some weight fluctuations over the COVID pandemic.    No chronic medical   Problems but it does appear  "that he has had borderline elevated  blood pressures and this was first noticed at his dentist office.  He checks his pressures at home and they are currently between 135- 140 systolic and 80-90 diastolic.  There is no problem list on file for this patient.      Current Medications include:   No current outpatient medications on file.     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      with 2 children in their 20's.     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 Then  at [employer]. 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     Enjoys watching sports and travel.     Also, does \"[employer]\" for exercise         Answers submitted by the patient for this visit:  Annual Preventive Visit (Submitted on 11/20/2023)  Chief Complaint: Annual Exam:  Frequency of exercise:: 4-5 days/week  Getting at least 3 servings of Calcium per day:: NO  Diet:: Regular (no restrictions)  Taking medications regularly:: Yes  Medication side effects:: Not applicable  Bi-annual eye exam:: Yes  Dental care twice a year:: Yes  Sleep apnea or symptoms of sleep apnea:: None  abdominal pain: No  Blood in stool: No  Blood in urine: No  chest pain: No  chills: No  congestion: No  constipation: No  cough: No  diarrhea: No  dizziness: No  ear pain: No  eye pain: No  nervous/anxious: No  fever: No  frequency: No  genital sores: No  headaches: No  hearing loss: No  heartburn: No  arthralgias: No  joint swelling: No  peripheral edema: No  mood changes: No  myalgias: No  nausea: No  dysuria: No  palpitations: No  Skin sensation changes: No  sore throat: No  urgency: No  rash: No  shortness of breath: No  visual disturbance: No  weakness: No  impotence: No  penile discharge: No  Additional concerns today:: No  Exercise outside of work (Submitted on 11/20/2023)  Chief Complaint: " "Annual Exam:  Duration of exercise:: 45-60 minutes  Does \"[employer]\" about 4-5 times/week.     Lifestyle habits and Preventive health issues:     Alcohol intake has decreased to about 5 nights/week and 2 drinks/night.    He does not use tobacco products.   His sleep is good.       MALE ROS  Partner: Has new female partner  ED: No concerns.  STD concerns: None.     Colorectal cancer screening --Just had a Colonoscopy and had 2 benign polyps. Recommends repeat in 5 years    ROS  PHQ-2 Score:          No data to display                Health Maintenance   Topic Date Due    ADVANCE CARE PLANNING  Never done    COLORECTAL CANCER SCREENING  Never done    HIV SCREENING  Never done    HEPATITIS C SCREENING  Never done    IPV IMMUNIZATION (2 of 3 - Adult catch-up series) 07/24/1998    LIPID  Never done    ZOSTER IMMUNIZATION (1 of 2) Never done    PHQ-2 (once per calendar year)  Never done    YEARLY PREVENTIVE VISIT  11/20/2024    DTAP/TDAP/TD IMMUNIZATION (3 - Td or Tdap) 11/20/2033    INFLUENZA VACCINE  Completed    HEPATITIS B IMMUNIZATION  Completed    COVID-19 Vaccine  Completed    Pneumococcal Vaccine: Pediatrics (0 to 5 Years) and At-Risk Patients (6 to 64 Years)  Aged Out    HPV IMMUNIZATION  Aged Out    MENINGITIS IMMUNIZATION  Aged Out    RSV MONOCLONAL ANTIBODY  Aged Out         History reviewed. No pertinent surgical history.    Family History   Problem Relation Age of Onset    Macular Degeneration Mother     Cancer Other          Immunizations are as follows:      Immunization History   Administered Date(s) Administered    COVID-19 12+ (2023-24) (Pfizer) 09/29/2023    COVID-19 Bivalent 18+ (Moderna) 11/07/2022    COVID-19 MONOVALENT 12+ (Pfizer) 12/27/2020, 01/15/2021, 10/20/2021, 05/05/2022    Flu, Unspecified 10/16/2013, 10/31/2014, 10/14/2015    HepB, Unspecified 08/01/1989, 09/01/1989, 02/04/1990    Hepatitis A (ADULT 19+) 05/01/1996, 01/02/1997    Hepatitis B, Adult 08/01/1989, 09/01/1989, 02/04/1990    " "Influenza (H1N1) 10/19/2009    Influenza (IIV3) PF 11/10/2006, 10/26/2016    Influenza Vaccine >6 months,quad, PF 10/19/2016, 10/14/2019, 09/18/2020, 10/20/2021, 09/25/2022    Influenza Vaccine Im 4yrs+ 4 Valent CCIIV4 10/30/2018, 10/01/2020, 10/20/2021    Influenza Vaccine, 6+MO IM (QUADRIVALENT W/PRESERVATIVES) 10/10/2017    Influenza, seasonal, injectable, PF 11/08/2007, 11/04/2008, 09/21/2009, 10/31/2011, 10/15/2012, 10/16/2013    Influenza,INJ,MDCK,PF,Quad >6mo(Flucelvax) 10/30/2018, 09/29/2023    MMR/V 08/04/2014    Meningococcal ACWY (Menactra ) 08/20/2012    OPV, trivalent, live 06/26/1998    Rabies - IM Diploid Cell Culture 08/28/2018, 08/31/2018, 09/04/2018, 09/11/2018    Rabies Immune Globulin 08/28/2018    TDAP (Adacel,Boostrix) 06/01/2010, 11/20/2023    Td (Adult), Adsorbed 08/22/1995, 02/01/2005    Typhoid IM 08/22/1995, 12/15/1999, 08/09/2002    Yellow Fever 08/23/1995, 06/01/2010         EXAM  BP (!) 166/92   Pulse 74   Temp 97.8  F (36.6  C) (Temporal)   Resp 17   Ht 1.861 m (6' 1.25\")   Wt 100.7 kg (222 lb)   SpO2 94%   BMI 29.09 kg/m    Repeat at   Vitals:    11/20/23 1418 11/20/23 1434   BP: (!) 150/85 (!) 166/92       GENERAL APPEARANCE: appears well.   HEENT: Tms normal. Neck is supple. No adenopathy, thyroid normal to palpation  RESP: Lungs clear to auscultation bilaterally.  Axillae: no palpable axillary masses or adenopathy  CV: regular rate and rhythm, normal S1 S2, no murmur, no carotid bruits  ABDOMEN: soft, nontender, without HSM or masses. Bowel sounds normal  : Deferred. Reported no concerns.  Rectal exam: deferred  MS: Gait - Normal. Extremities with generally normal range of motion. No deformities noted other than slight decreased IROM of RIGHT hip  SKIN: No suspicious lesions or rashes  NEURO: Normal strength and tone, sensory exam grossly normal  PSYCH: mentation and affect appear normal.   EXT: No peripheral edema        SEE TOP OF NOTE FOR ASSESSMENT AND PLAN      Victor M" MD Tracey  Family Medicine and Sports Medicine  [facility] Department of Family Medicine and Community Health

## 2023-12-08 ENCOUNTER — LAB (OUTPATIENT)
Dept: LAB | Facility: CLINIC | Age: 58
End: 2023-12-08
Payer: COMMERCIAL

## 2023-12-08 DIAGNOSIS — Z13.220 SCREENING FOR LIPID DISORDERS: ICD-10-CM

## 2023-12-08 DIAGNOSIS — Z13.1 SCREENING FOR DIABETES MELLITUS: ICD-10-CM

## 2023-12-08 DIAGNOSIS — Z12.5 SCREENING FOR PROSTATE CANCER: ICD-10-CM

## 2023-12-08 LAB
ANION GAP SERPL CALCULATED.3IONS-SCNC: 10 MMOL/L (ref 7–15)
BUN SERPL-MCNC: 11.8 MG/DL (ref 6–20)
CALCIUM SERPL-MCNC: 8.9 MG/DL (ref 8.6–10)
CHLORIDE SERPL-SCNC: 102 MMOL/L (ref 98–107)
CHOLEST SERPL-MCNC: 205 MG/DL
CREAT SERPL-MCNC: 0.91 MG/DL (ref 0.67–1.17)
DEPRECATED HCO3 PLAS-SCNC: 27 MMOL/L (ref 22–29)
EGFRCR SERPLBLD CKD-EPI 2021: >90 ML/MIN/1.73M2
FASTING STATUS PATIENT QL REPORTED: YES
GLUCOSE SERPL-MCNC: 86 MG/DL (ref 70–99)
HDLC SERPL-MCNC: 38 MG/DL
LDLC SERPL CALC-MCNC: 147 MG/DL
NONHDLC SERPL-MCNC: 167 MG/DL
POTASSIUM SERPL-SCNC: 4.3 MMOL/L (ref 3.4–5.3)
PSA SERPL DL<=0.01 NG/ML-MCNC: 0.52 NG/ML (ref 0–3.5)
SODIUM SERPL-SCNC: 139 MMOL/L (ref 135–145)
TRIGL SERPL-MCNC: 99 MG/DL

## 2023-12-08 PROCEDURE — G0103 PSA SCREENING: HCPCS | Performed by: FAMILY MEDICINE

## 2023-12-08 PROCEDURE — 80048 BASIC METABOLIC PNL TOTAL CA: CPT | Mod: ORL | Performed by: FAMILY MEDICINE

## 2023-12-08 PROCEDURE — 80061 LIPID PANEL: CPT | Mod: ORL | Performed by: FAMILY MEDICINE

## 2023-12-10 DIAGNOSIS — E78.00 HYPERCHOLESTEREMIA: Primary | ICD-10-CM

## 2023-12-10 RX ORDER — ATORVASTATIN CALCIUM 10 MG/1
10 TABLET, FILM COATED ORAL DAILY
Qty: 90 TABLET | Refills: 1 | Status: SHIPPED | OUTPATIENT
Start: 2023-12-10 | End: 2024-05-31

## 2024-01-21 ENCOUNTER — MYC MEDICAL ADVICE (OUTPATIENT)
Dept: FAMILY MEDICINE | Facility: CLINIC | Age: 59
End: 2024-01-21

## 2024-01-22 DIAGNOSIS — I10 HYPERTENSION, ESSENTIAL: Primary | ICD-10-CM

## 2024-01-22 RX ORDER — LOSARTAN POTASSIUM 25 MG/1
25 TABLET ORAL DAILY
Qty: 90 TABLET | Refills: 1 | Status: SHIPPED | OUTPATIENT
Start: 2024-01-22 | End: 2024-05-08 | Stop reason: DRUGHIGH

## 2024-03-14 DIAGNOSIS — I10 HYPERTENSION, ESSENTIAL: ICD-10-CM

## 2024-04-25 DIAGNOSIS — E78.00 HYPERCHOLESTEREMIA: Primary | ICD-10-CM

## 2024-05-08 ENCOUNTER — MYC MEDICAL ADVICE (OUTPATIENT)
Dept: FAMILY MEDICINE | Facility: CLINIC | Age: 59
End: 2024-05-08

## 2024-05-08 DIAGNOSIS — I10 HYPERTENSION, ESSENTIAL: ICD-10-CM

## 2024-05-08 RX ORDER — LOSARTAN POTASSIUM 100 MG/1
100 TABLET ORAL DAILY
Qty: 90 TABLET | Refills: 1 | Status: SHIPPED | OUTPATIENT
Start: 2024-05-08

## 2024-05-08 NOTE — TELEPHONE ENCOUNTER
Refilled at 100 mg daily dosage as approved by Dr. Webb.    Ameya Elizabeth - ROJELIO, RN  05/08/24 11:27 AM

## 2024-05-09 ENCOUNTER — LAB (OUTPATIENT)
Dept: LAB | Facility: CLINIC | Age: 59
End: 2024-05-09
Payer: COMMERCIAL

## 2024-05-09 DIAGNOSIS — E78.00 HYPERCHOLESTEREMIA: ICD-10-CM

## 2024-05-09 LAB
CHOLEST SERPL-MCNC: 161 MG/DL
FASTING STATUS PATIENT QL REPORTED: YES
HDLC SERPL-MCNC: 45 MG/DL
LDLC SERPL CALC-MCNC: 96 MG/DL
NONHDLC SERPL-MCNC: 116 MG/DL
TRIGL SERPL-MCNC: 98 MG/DL

## 2024-05-09 PROCEDURE — 80061 LIPID PANEL: CPT | Mod: ORL | Performed by: FAMILY MEDICINE

## 2024-05-29 DIAGNOSIS — E78.00 HYPERCHOLESTEREMIA: ICD-10-CM

## 2024-05-31 RX ORDER — ATORVASTATIN CALCIUM 10 MG/1
10 TABLET, FILM COATED ORAL DAILY
Qty: 90 TABLET | Refills: 3 | Status: SHIPPED | OUTPATIENT
Start: 2024-05-31

## 2024-05-31 NOTE — TELEPHONE ENCOUNTER
Medication requested: atorvastatin (LIPITOR) 10 MG tablet   Last office visit: 11/20/23  [facility] appointments: none  Medication last refilled: 12/10/23; 90 + 1 refill  Last qualifying labs:   Component      Latest Ref Rng 5/9/2024  10:06 AM   Cholesterol      <200 mg/dL 161    Triglycerides      <150 mg/dL 98    HDL Cholesterol      >=40 mg/dL 45    LDL Cholesterol Calculated      <=100 mg/dL 96    Non HDL Cholesterol      <130 mg/dL 116    Patient Fasting? Yes      Prescription approved per [facility] Refill Protocol.    Ameya SERRATO, RN  05/31/24 11:01 AM

## 2024-10-24 DIAGNOSIS — I10 HYPERTENSION, ESSENTIAL: ICD-10-CM

## 2024-10-24 RX ORDER — LOSARTAN POTASSIUM 100 MG/1
100 TABLET ORAL DAILY
Qty: 90 TABLET | Refills: 0 | Status: SHIPPED | OUTPATIENT
Start: 2024-10-24

## 2024-10-24 NOTE — TELEPHONE ENCOUNTER
Medication requested: losartan (COZAAR) 100 MG tablet   Last office visit: 11/20/23  [facility] appointments: none  Medication last refilled: 5/8/24; 90 + 1 refill  Last qualifying labs:   BP Readings from Last 3 Encounters:   11/20/23 (!) 166/92     Component      Latest Ref Rng 12/8/2023  10:21 AM   Potassium      3.4 - 5.3 mmol/L 4.3      Component      Latest Ref Rng 12/8/2023  10:21 AM   GFR Estimate      >60 mL/min/1.73m2 >90      Prescription approved per [facility] Refill Protocol.    Ameya SERRATO, RN  10/24/24 11:44 AM

## 2025-01-19 ENCOUNTER — HEALTH MAINTENANCE LETTER (OUTPATIENT)
Age: 60
End: 2025-01-19

## 2025-01-22 DIAGNOSIS — I10 HYPERTENSION, ESSENTIAL: ICD-10-CM

## 2025-01-22 RX ORDER — LOSARTAN POTASSIUM 100 MG/1
100 TABLET ORAL DAILY
Qty: 30 TABLET | Refills: 0 | Status: SHIPPED | OUTPATIENT
Start: 2025-01-22

## 2025-01-22 NOTE — TELEPHONE ENCOUNTER
Losartan (Cozaar) 100 mg    Last Office Visit: 11/20/23  Future [facility] Appointments: 2/10/25  Medication last refilled: 10/24/24 #90 with 0 refill(s)    Vital Signs Systolic Diastolic   11/20/23 150 85     Required labs per protocol:    LAB REF RANGE 12/8/23   GFR >60 mL/min/1.73m2  >90   Creatinine 0.67-1.17 mg/dL 0.91   Potassium 3.4-5.3 mmol/L 4.3     Medication is being filled for 1 time refill only due to:  Patient needs labs BMP. Patient needs to be seen because it has been more than one year since last visit. Has appt scheduled 2/10/25.    Sejal Watters, VANNESAN, RN, CCM

## 2025-02-10 ENCOUNTER — OFFICE VISIT (OUTPATIENT)
Dept: FAMILY MEDICINE | Facility: CLINIC | Age: 60
End: 2025-02-10
Payer: COMMERCIAL

## 2025-02-10 VITALS
OXYGEN SATURATION: 98 % | SYSTOLIC BLOOD PRESSURE: 128 MMHG | WEIGHT: 210.75 LBS | BODY MASS INDEX: 27.62 KG/M2 | TEMPERATURE: 97.7 F | HEART RATE: 70 BPM | DIASTOLIC BLOOD PRESSURE: 76 MMHG | RESPIRATION RATE: 14 BRPM

## 2025-02-10 DIAGNOSIS — I10 HYPERTENSION, ESSENTIAL: Primary | ICD-10-CM

## 2025-02-10 RX ORDER — LOSARTAN POTASSIUM 100 MG/1
100 TABLET ORAL DAILY
Qty: 90 TABLET | Refills: 3 | Status: SHIPPED | OUTPATIENT
Start: 2025-02-17

## 2025-02-10 NOTE — PROGRESS NOTES
ASSESSMENT and PLAN:      1. Hypertension, essential (Primary)  Refilled:   - losartan (COZAAR) 100 MG tablet; Take 1 tablet (100 mg) by mouth daily.  Dispense: 90 tablet; Refill: 3      Follow-up in a few months for an annual exam with labs.     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 That was in Nov. 2023, over a year ago. Has hypertension and is on Losartan.     States that blood pressure has been under control.  He is exercising regularly and feeling good except for under lots of stress at work as there are many mergers occurring.    Review Of Systems:    See subjective.   Has otherwise been in usual state of health    Problem list per EMR:  Patient Active Problem List   Diagnosis    Hypercholesteremia       Current Outpatient Medications   Medication Sig Dispense Refill    atorvastatin (LIPITOR) 10 MG tablet Take 1 tablet by mouth daily. 90 tablet 3    [START ON 2/17/2025] losartan (COZAAR) 100 MG tablet Take 1 tablet (100 mg) by mouth daily. 90 tablet 3       Allergies   Allergen Reactions    Bees Anaphylaxis          OBJECTIVE    Vitals: /76 (BP Location: Left arm, Patient Position: Sitting, Cuff Size: Adult Large)   Pulse 70   Temp 97.7  F (36.5  C) (Temporal)   Resp 14   Wt 95.6 kg (210 lb 12 oz)   SpO2 98%   BMI 27.62 kg/m    BMI= Body mass index is 27.62 kg/m .    He appears well.  Normal vitals as per above.  Normal gait.    SEE TOP OF NOTE FOR ASSESSMENT AND PLAN    --Victor M Webb MD  [facility], Department of Family Medicine and Community Health

## 2025-02-10 NOTE — NURSING NOTE
"59 year old  Chief Complaint   Patient presents with    Follow Up     HTN       Blood pressure 128/76, pulse 70, temperature 97.7  F (36.5  C), temperature source Temporal, resp. rate 14, weight 95.6 kg (210 lb 12 oz), SpO2 98%. Body mass index is 27.62 kg/m .  Patient Active Problem List   Diagnosis    Hypercholesteremia       Wt Readings from Last 2 Encounters:   02/10/25 95.6 kg (210 lb 12 oz)   11/20/23 100.7 kg (222 lb)     BP Readings from Last 3 Encounters:   02/10/25 128/76   11/20/23 (!) 166/92         Current Outpatient Medications   Medication Sig Dispense Refill    atorvastatin (LIPITOR) 10 MG tablet Take 1 tablet by mouth daily. 90 tablet 3    losartan (COZAAR) 100 MG tablet Take 1 tablet by mouth daily. 30 tablet 0     No current facility-administered medications for this visit.       Social History     Tobacco Use    Smoking status: Never    Smokeless tobacco: Never   Vaping Use    Vaping status: Never Used   Substance Use Topics    Alcohol use: Yes     Alcohol/week: 9.0 standard drinks of alcohol     Types: 9 Standard drinks or equivalent per week    Drug use: Never       Health Maintenance Due   Topic Date Due    ADVANCE CARE PLANNING  Never done    COLORECTAL CANCER SCREENING  Never done    HIV SCREENING  Never done    Pneumococcal Vaccine: 50+ Years (1 of 1 - PCV) Never done    ZOSTER IMMUNIZATION (1 of 2) Never done    INFLUENZA VACCINE (1) 09/01/2024    COVID-19 Vaccine (7 - 2024-25 season) 09/01/2024    YEARLY PREVENTIVE VISIT  11/20/2024    BMP  12/08/2024    PHQ-2 (once per calendar year)  Never done       No results found for: \"PAP\"      February 10, 2025 4:34 PM    "

## 2025-05-08 DIAGNOSIS — E78.00 HYPERCHOLESTEREMIA: ICD-10-CM

## 2025-05-08 RX ORDER — ATORVASTATIN CALCIUM 10 MG/1
10 TABLET, FILM COATED ORAL DAILY
Qty: 90 TABLET | Refills: 0 | Status: SHIPPED | OUTPATIENT
Start: 2025-05-08

## 2025-05-08 NOTE — TELEPHONE ENCOUNTER
Medication requested: atorvastatin (LIPITOR) 10 MG tablet   Last office visit: 2/10/25  [facility] appointments: 5/20/25  Medication last refilled: 5/31/24; 90 + 3 refills  Last qualifying labs:   Component      Latest Ref Rng 5/9/2024  10:06 AM   Cholesterol      <200 mg/dL 161    Triglycerides      <150 mg/dL 98    HDL Cholesterol      >=40 mg/dL 45    LDL Cholesterol Calculated      <=100 mg/dL 96    Non HDL Cholesterol      <130 mg/dL 116    Patient Fasting? Yes      Medication is being filled for 1 time refill only due to:  Patient needs labs lipid panel. Patient needs to be seen because it has been more than one year since last visit.    Ameya SERRATO, RN  05/08/25 1:38 PM

## 2025-05-20 ENCOUNTER — OFFICE VISIT (OUTPATIENT)
Dept: FAMILY MEDICINE | Facility: CLINIC | Age: 60
End: 2025-05-20
Payer: COMMERCIAL

## 2025-05-20 VITALS
DIASTOLIC BLOOD PRESSURE: 81 MMHG | TEMPERATURE: 97.6 F | HEART RATE: 67 BPM | OXYGEN SATURATION: 95 % | SYSTOLIC BLOOD PRESSURE: 131 MMHG

## 2025-05-20 DIAGNOSIS — I10 ESSENTIAL HYPERTENSION, BENIGN: ICD-10-CM

## 2025-05-20 DIAGNOSIS — E78.00 HYPERCHOLESTEREMIA: ICD-10-CM

## 2025-05-20 DIAGNOSIS — E78.5 HYPERLIPIDEMIA LDL GOAL <100: Primary | ICD-10-CM

## 2025-05-20 RX ORDER — ATORVASTATIN CALCIUM 10 MG/1
10 TABLET, FILM COATED ORAL DAILY
Qty: 90 TABLET | Refills: 2 | Status: SHIPPED | OUTPATIENT
Start: 2025-08-10

## 2025-05-20 NOTE — PROGRESS NOTES
ASSESSMENT and PLAN:       Hyperlipidemia with recently normal Lipids  Refilled Lipitor  Hypertension, doing well on Losartan  Right proximal hamstring pain, likely from some RIGHT hip arthritis  Discussed but deferred X-rays or PT at this time  Let me know if things change    Follow-up in about 4-5 months for an annual exam    Victor M Webb MD  Family Medicine and Sports Medicine  [facility]      Medical assistant intake:  Nick Erwin is a 60 year old male who presents to [facility] today for Recheck Medication (Losartan and Atorvastatin )      SUBJECTIVE:    Dennis is here for scheduled follow-up of his hyperlipidemia and hypertension.      He recently had his blood work drawn about 6 weeks ago on March 31 at an outside facility.    His labs were all stable with a total cholesterol of 169, HDL of 53 and LDL at 98.  Triglycerides normal at 86.  Glucose normal at 76 and his A1c was normal at 5.5.  He had normal LFTs and also a normal PSA at 0.4.      His blood pressure readings has also been normal.    Review Of Systems:    He is otherwise in his usual state of health although for the past 2 months he has noticed a slight strain to his right proximal hamstring region.  He believes that this occurred during an [employer] workout.  It seems to be improving slightly.    Problem list per EMR:  Patient Active Problem List   Diagnosis    Hypercholesteremia       Current Outpatient Medications   Medication Sig Dispense Refill    atorvastatin (LIPITOR) 10 MG tablet Take 1 tablet by mouth daily. 90 tablet 0    losartan (COZAAR) 100 MG tablet Take 1 tablet (100 mg) by mouth daily. 90 tablet 3       Allergies   Allergen Reactions    Bees Anaphylaxis        Social:     Unchanged    OBJECTIVE    Vitals: /81   Pulse 67   Temp 97.6  F (36.4  C)   SpO2 95%   BMI= There is no height or weight on file to calculate BMI.   he appears well and in his usual state of good health.    His right hamstring  discomfort is located somewhat proximally near the ischial tuberosity.  His right hip appears to have some arthritis as it goes into slight external rotation with flexion.  He also has decreased internal range of motion of the right hip as compared to the left hip.  Both are somewhat limited but the right hip goes to about 5 to 10 degrees whereas the left hip goes to about 15 degrees of internal range of motion.    SEE TOP OF NOTE FOR ASSESSMENT AND PLAN    --Victor M Webb MD  [facility], Department of Family Medicine and Community Health

## 2025-05-20 NOTE — NURSING NOTE
"Dennis  60 year old    Chief Complaint   Patient presents with    Recheck Medication     Losartan and Atorvastatin            Blood pressure 131/81, pulse 67, temperature 97.6  F (36.4  C), SpO2 95%. There is no height or weight on file to calculate BMI.    Patient Active Problem List   Diagnosis    Hypercholesteremia             Wt Readings from Last 2 Encounters:   02/10/25 95.6 kg (210 lb 12 oz)   11/20/23 100.7 kg (222 lb)       BP Readings from Last 3 Encounters:   05/20/25 131/81   02/10/25 128/76   11/20/23 (!) 166/92                Current Outpatient Medications   Medication Sig Dispense Refill    atorvastatin (LIPITOR) 10 MG tablet Take 1 tablet by mouth daily. 90 tablet 0    losartan (COZAAR) 100 MG tablet Take 1 tablet (100 mg) by mouth daily. 90 tablet 3     No current facility-administered medications for this visit.             Social History     Tobacco Use    Smoking status: Never    Smokeless tobacco: Never   Vaping Use    Vaping status: Never Used   Substance Use Topics    Alcohol use: Yes     Alcohol/week: 9.0 standard drinks of alcohol     Types: 9 Standard drinks or equivalent per week    Drug use: Never             Health Maintenance Due   Topic Date Due    ADVANCE CARE PLANNING  Never done    HIV SCREENING  Never done    Pneumococcal Vaccine: 50+ Years (1 of 1 - PCV) Never done    ZOSTER IMMUNIZATION (1 of 2) Never done    COVID-19 Vaccine (7 - 2024-25 season) 09/01/2024    YEARLY PREVENTIVE VISIT  11/20/2024    BMP  12/08/2024    LIPID  05/09/2025           No results found for: \"PAP\"           May 20, 2025 7:57 AM    "

## 2025-05-20 NOTE — PATIENT INSTRUCTIONS
ASSESSMENT and PLAN:       Hyperlipidemia with recently normal Lipids  Refilled Lipitor  Hypertension, doing well on Losartan  Right proximal hamstring pain, likely from some RIGHT hip arthritis  Discussed but deferred X-rays or PT at this time  Let me know if things change    Follow-up in about 4-5 months for an annual exam    Victor M Webb MD  Family Medicine and Sports Medicine  [facility]

## (undated) DEVICE — MMIS - SHIELD EYE SPEYES EYE SHIELDZ ULCLR DISP LENS CLR

## (undated) DEVICE — Device

## (undated) DEVICE — MMIS - NEEDLE SCLRTX 25GA 5MM 230CM INJ PRJ STRL DISP

## (undated) DEVICE — MMIS - CONNECTOR IRR ENDOGATOR AUX H2O JET TUBE OLYMPUS

## (undated) DEVICE — SYRINGE 17GA 3ML 2 SDPRT CANNULA CENTERPOINT BLUNT STRL MED

## (undated) DEVICE — MMIS - TUBING SCT CLR 6FT 3/16IN MDVC NCDTV MALE TO MALE

## (undated) DEVICE — MMIS - CLIP HMST 235CM BRAID TCTL FEEDBACK RSL 360

## (undated) DEVICE — MMIS - KIT ENDOSCOPIC 250ML BDSD ENZ SLR DISP LF

## (undated) DEVICE — MMIS - BASIN EMESIS 500ML MOLD GRAD GRFT PLASTIC 16OZ LF

## (undated) DEVICE — SYRINGE 17GA 5ML 2 SDPRT CANNULA CENTERPOINT BLUNT STRL MED

## (undated) DEVICE — MMIS - VALVE BTN DEFENDO AIR/H20 KIT SCT STRL DISP BIOPSY

## (undated) DEVICE — MMIS - TUBING IRR 82IN ENDOGATOR SFGRD BACK FLOW VLV COST

## (undated) DEVICE — MMIS - SPONGE GAUZE 4X4IN RAYON POLY 4 PLY HI ABS

## (undated) DEVICE — KIT PROC DISP VLV CNCT ENDO CARRY-ON DEFENDO

## (undated) DEVICE — MASK O2 ADULT SMPL LN MALE TO MALE MED CONC ADPR 7FT 10FT

## (undated) DEVICE — MMIS - ATTACHMENT ESCP 4MM 15MM RND EDGE STRL LF DISP

## (undated) DEVICE — CANNULA VIAL 16GA ANTICORE ANTIPARTICULATE MNJCT SMARTIP

## (undated) DEVICE — ATTACHMENT ESCP 4MM 13.4MM RND EDGE STRL LF DISP

## (undated) DEVICE — SNARE 10MM RND 240CM 2.4MM CAPTIVATOR COLD THIN WIRE ROBUST

## (undated) DEVICE — MMIS - SOLUTION 1L AQLT H2O STRL LF IRR

## (undated) DEVICE — MMIS - SYRINGE 50ML GRAD N-PYRG DEHP-FR PVC FREE STRL MED

## (undated) DEVICE — MMIS - SNARE SM OVAL 240CM SNS ENDO 13MM